# Patient Record
Sex: FEMALE | Race: WHITE | NOT HISPANIC OR LATINO
[De-identification: names, ages, dates, MRNs, and addresses within clinical notes are randomized per-mention and may not be internally consistent; named-entity substitution may affect disease eponyms.]

---

## 2021-04-22 PROBLEM — Z00.00 ENCOUNTER FOR PREVENTIVE HEALTH EXAMINATION: Status: ACTIVE | Noted: 2021-04-22

## 2021-04-28 ENCOUNTER — APPOINTMENT (OUTPATIENT)
Dept: UROGYNECOLOGY | Facility: CLINIC | Age: 46
End: 2021-04-28
Payer: COMMERCIAL

## 2021-04-28 ENCOUNTER — OUTPATIENT (OUTPATIENT)
Dept: OUTPATIENT SERVICES | Facility: HOSPITAL | Age: 46
LOS: 1 days | Discharge: HOME | End: 2021-04-28

## 2021-04-28 ENCOUNTER — APPOINTMENT (OUTPATIENT)
Dept: UROGYNECOLOGY | Facility: CLINIC | Age: 46
End: 2021-04-28

## 2021-04-28 VITALS
WEIGHT: 152 LBS | HEIGHT: 61 IN | BODY MASS INDEX: 28.7 KG/M2 | DIASTOLIC BLOOD PRESSURE: 78 MMHG | SYSTOLIC BLOOD PRESSURE: 128 MMHG

## 2021-04-28 DIAGNOSIS — N30.10 INTERSTITIAL CYSTITIS (CHRONIC) WITHOUT HEMATURIA: ICD-10-CM

## 2021-04-28 DIAGNOSIS — Z80.7 FAMILY HISTORY OF OTHER MALIGNANT NEOPLASMS OF LYMPHOID, HEMATOPOIETIC AND RELATED TISSUES: ICD-10-CM

## 2021-04-28 DIAGNOSIS — M79.10 MYALGIA, UNSPECIFIED SITE: ICD-10-CM

## 2021-04-28 PROCEDURE — 51701 INSERT BLADDER CATHETER: CPT

## 2021-04-28 PROCEDURE — 99205 OFFICE O/P NEW HI 60 MIN: CPT | Mod: 25

## 2021-04-29 LAB
APPEARANCE: CLEAR
BILIRUBIN URINE: NEGATIVE
BLOOD URINE: NEGATIVE
COLOR: NORMAL
GLUCOSE QUALITATIVE U: NEGATIVE
KETONES URINE: NEGATIVE
LEUKOCYTE ESTERASE URINE: NEGATIVE
NITRITE URINE: NEGATIVE
PH URINE: 7.5
PROTEIN URINE: NORMAL
SPECIFIC GRAVITY URINE: 1.01
UROBILINOGEN URINE: NORMAL

## 2021-04-29 NOTE — PHYSICAL EXAM
[Chaperone Present] : A chaperone was present in the examining room during all aspects of the physical examination [FreeTextEntry1] : Void: 50 cc\par PVR: 10 cc\par Urethra was prepped in sterile fashion and then a sterile catheter was used by me to drain the bladder.\par  \par Well healed incision: Pfannenstiel\par normal perineal sensation\par normal perineal reflexes\par negative cough stress test\par positive atrophy\par positive vestibular tenderness\par no prolapse\par positive urethral hypermobility\par bilateral levator ani spasm present with tenderness\par negative urethral tenderness\par mild bladder tenderness\par 1/5 Kegel\par

## 2021-04-29 NOTE — HISTORY OF PRESENT ILLNESS
[FreeTextEntry1] : \par Pt with pelvic floor dysfunction here for urogynecologic evaluation. She describes: \par \par Chief PFD: flares\par \par Diagnsed 19 years ago based on symptoms and a cysto in the office showing inflammation\par elmiron for 3 years, years ago\par bladder installations\par diagnosed with interstitial cystitis 19 years ago, received rx with elmiron x 2-3 years and bladder instillations which helped with the pain. She reports the pain subsided and has not taken medication since then. Reports "flares" of pain for the past 5 months associated with voids q2h and pain relieved with voiding. Last cystoscopy- years ago.\par gabapentin and flexeril-did not do well with\par Has done diet changes and taken irritants out of her diet years ago\par \par Pelvic organ prolapse: s/p c/s x3, no bulge, denies splinting \par Stress urinary incontinence: denies\par Overactive bladder syndrome: denies incontinence episodes  \par Voiding dysfunction: denies incomplete bladder emptying, no hesitancy \par Lower urinary tract/vaginal symptoms:  Currently denies hematuria, dysuria. No UTIs in the past year, denies hematuria, dysuria, bladder pain \par Fecal incontinence: denies\par Defecatory dysfunction: sausage\par Sexual dysfunction: reports pain with penetration, did not improve with pelvic PT. \par Pelvic pain: denies\par Vaginal dryness: denies  \par \par Her pelvic floor symptoms are significantly bothersome and negatively impacting her quality of life. \par \par

## 2021-04-29 NOTE — COUNSELING
[FreeTextEntry1] : \par We will notify you of the urine results if they are abnormal\par \par Please start taking the elavil (nerve pain medication) to help with the pain and pressure. It can make you sleepy\par \par Please call my office if you have any issues with the cost or side effects of the medication.\par \par Schedule 6 week med check with my PAJennifer (IC)

## 2021-04-29 NOTE — DISCUSSION/SUMMARY
[FreeTextEntry1] : \par Interstitial cystitis-\par We reviewed the management options for interstitial cystitis/painful bladder syndrome, including dietary modification, over the counter medications for symptom relief, other medications including Elmiron, amitriptyline, Cymbalta, neurontin and hydroxyzine. We discussed that Elmiron is the only FDA approved medication for this indication. We also discussed that PT, acupuncture, and bladder installations can be helpful. Diagnostic(and possible therapeutic) options include cysto with hydrodistention.\par \par The patient voiced understanding and agrees with medical management. The risks and benefits of elavil was reviewed.\par \par Myalgia-\par Discussed the pathophysiology of the above condition. Reviewed management options including medications (oral or vaginal suppository), injections, pelvic floor physical therapy, or referral for possible pudendal nerve blocks. The patient agrees to observation since she is not sexually active now (recently ).\par

## 2021-04-30 LAB — BACTERIA UR CULT: NORMAL

## 2021-06-15 ENCOUNTER — RX RENEWAL (OUTPATIENT)
Age: 46
End: 2021-06-15

## 2021-06-17 ENCOUNTER — APPOINTMENT (OUTPATIENT)
Dept: UROGYNECOLOGY | Facility: CLINIC | Age: 46
End: 2021-06-17
Payer: COMMERCIAL

## 2021-06-17 VITALS
HEIGHT: 61 IN | SYSTOLIC BLOOD PRESSURE: 135 MMHG | BODY MASS INDEX: 28.51 KG/M2 | WEIGHT: 151 LBS | DIASTOLIC BLOOD PRESSURE: 88 MMHG | HEART RATE: 80 BPM

## 2021-06-17 PROCEDURE — 99213 OFFICE O/P EST LOW 20 MIN: CPT

## 2021-06-17 NOTE — DISCUSSION/SUMMARY
[FreeTextEntry1] : Interstitial Cystitis\par Patient happy with Elavil 25mg QHS\par Refills provided. 30 days supply with 5 refills.\par Precautions reviewed.\par Will return in 6 months for follow up or earlier if she has any issues.\par

## 2021-06-17 NOTE — COUNSELING
[FreeTextEntry1] : If you feel like you have an infection it is important for you to call our office and we will arrange testing of your urine.\par \par Please continue taking Amitriptyline 25mg for pain. Refills sent to your pharmacy.\par \par Please call my office if you have any issues with the cost or side effects of the medication. \par \par Schedule a 6 months follow up med check appointment.\par

## 2021-06-17 NOTE — HISTORY OF PRESENT ILLNESS
[FreeTextEntry1] : Patient is here for 6 weeks med check for interstitial cystitis.\par Last seen on  4/28/21 as a new pt with flares of bladder pain. \par \par Pt was diagnosed with interstitial cystitis 19 years ago, received rx with Elmiron x 2-3 years and bladder instillations which helped with the pain.\par Reports "flares" of pain for the past 5 months associated with voids q2h and pain relieved with voiding. Last cystoscopy- years ago.\par gabapentin and flexeril: no benefit\par \par s/p C/S x3\par \par Elavil 25mg QHS\par \par Today, patient states she is  very happy with Elavil 25mg QHS  and is noticing significant improvement. Denies side effects. Patient does not feel she has an infection. SHe has not had any pain, her urination is normal and states that this has been life changing for her. \par \par Patient would like to continue Elavil 25mg QHS.\par

## 2021-12-17 ENCOUNTER — APPOINTMENT (OUTPATIENT)
Dept: OBGYN | Facility: CLINIC | Age: 46
End: 2021-12-17
Payer: COMMERCIAL

## 2021-12-17 VITALS
WEIGHT: 169.38 LBS | HEIGHT: 61 IN | BODY MASS INDEX: 31.98 KG/M2 | SYSTOLIC BLOOD PRESSURE: 130 MMHG | DIASTOLIC BLOOD PRESSURE: 84 MMHG

## 2021-12-17 VITALS
WEIGHT: 178 LBS | HEIGHT: 61 IN | BODY MASS INDEX: 33.61 KG/M2 | DIASTOLIC BLOOD PRESSURE: 72 MMHG | SYSTOLIC BLOOD PRESSURE: 102 MMHG

## 2021-12-17 DIAGNOSIS — Z87.59 PERSONAL HISTORY OF OTHER COMPLICATIONS OF PREGNANCY, CHILDBIRTH AND THE PUERPERIUM: ICD-10-CM

## 2021-12-17 LAB — TSH SERPL-ACNC: 1.76 UIU/ML

## 2021-12-17 PROCEDURE — 99386 PREV VISIT NEW AGE 40-64: CPT

## 2021-12-17 PROCEDURE — 99396 PREV VISIT EST AGE 40-64: CPT

## 2021-12-17 NOTE — HISTORY OF PRESENT ILLNESS
[No] : Patient does not have concerns regarding sex [FreeTextEntry1] : 47 yo P3, lmp 8 years ago, here for routine gyn visit. Pt followed by urogyn for interstitial cystitis. Pt denies vaginal bleeding. Pt otherwise without complaints

## 2021-12-17 NOTE — PLAN
[FreeTextEntry1] : Routine gyn\par fsh/estradiol/tsh\par pap/hpv\par mammo ref\par primary care with pmd\par pt tp rv in 1 year or prn

## 2021-12-17 NOTE — PHYSICAL EXAM
[Chaperone Present] : A chaperone was present in the examining room during all aspects of the physical examination [Appropriately responsive] : appropriately responsive [Soft] : soft [Non-tender] : non-tender [Non-distended] : non-distended [Oriented x3] : oriented x3 [Examination Of The Breasts] : a normal appearance [No Masses] : no breast masses were palpable [Labia Majora] : normal [Labia Minora] : normal [Normal] : normal [Uterine Adnexae] : normal

## 2021-12-18 LAB
ESTRADIOL SERPL-MCNC: <5 PG/ML
FSH SERPL-MCNC: 47.6 IU/L

## 2021-12-22 ENCOUNTER — OUTPATIENT (OUTPATIENT)
Dept: OUTPATIENT SERVICES | Facility: HOSPITAL | Age: 46
LOS: 1 days | Discharge: HOME | End: 2021-12-22
Payer: COMMERCIAL

## 2021-12-22 ENCOUNTER — RESULT REVIEW (OUTPATIENT)
Age: 46
End: 2021-12-22

## 2021-12-22 DIAGNOSIS — Z12.31 ENCOUNTER FOR SCREENING MAMMOGRAM FOR MALIGNANT NEOPLASM OF BREAST: ICD-10-CM

## 2021-12-22 LAB — HPV HIGH+LOW RISK DNA PNL CVX: NOT DETECTED

## 2021-12-22 PROCEDURE — 77063 BREAST TOMOSYNTHESIS BI: CPT | Mod: 26

## 2021-12-22 PROCEDURE — 77067 SCR MAMMO BI INCL CAD: CPT | Mod: 26

## 2021-12-23 LAB — CYTOLOGY CVX/VAG DOC THIN PREP: NORMAL

## 2022-03-04 ENCOUNTER — TRANSCRIPTION ENCOUNTER (OUTPATIENT)
Age: 47
End: 2022-03-04

## 2022-06-27 ENCOUNTER — APPOINTMENT (OUTPATIENT)
Dept: UROGYNECOLOGY | Facility: CLINIC | Age: 47
End: 2022-06-27
Payer: COMMERCIAL

## 2022-06-27 VITALS
HEIGHT: 61 IN | WEIGHT: 165 LBS | HEART RATE: 82 BPM | DIASTOLIC BLOOD PRESSURE: 82 MMHG | SYSTOLIC BLOOD PRESSURE: 121 MMHG | BODY MASS INDEX: 31.15 KG/M2

## 2022-06-27 PROCEDURE — 99213 OFFICE O/P EST LOW 20 MIN: CPT

## 2022-06-27 NOTE — HISTORY OF PRESENT ILLNESS
[FreeTextEntry1] : Patient is here for 12 month med check for interstitial cystitis.\par Last seen on 6/17/2021 for med check.\par \par Pt was diagnosed with interstitial cystitis 19 years ago, received rx with Elmiron x 2-3 years and bladder instillations which helped with the pain.\par Reports "flares" of pain for the past 5 months associated with voids q2h and pain relieved with voiding. Last cystoscopy- years ago.\par gabapentin and flexeril: no benefit\par \par s/p C/S x3\par \par Elavil 25mg QHS\par \par Today, patient states she is happy with Elavil 25 mg QHS and is noticing improvement. Denies side effects. Patient does not feel she has an infection.\par \par Patient would like to continue Elavil 25 mg QHS. She occasionally skips a day or two and does not experience pain and is very happy with the medication. \par

## 2022-06-27 NOTE — COUNSELING
[FreeTextEntry1] : \par If you feel like you have an infection it is important for you to call our office and we will arrange testing of your urine.\par \par Please continue taking Amitriptyline 25 mg for frequency. Refills sent to your pharmacy\par \par Please call my office if you have any issues with the cost or side effects of the medication. \par \par Schedule a 12 months follow up med check appointment with RACHEL Sen.\par

## 2022-06-27 NOTE — DISCUSSION/SUMMARY
[FreeTextEntry1] : \par Interstitial Cystitis\par Patient happy with Elavil 25 mg QHS\par Refills provided. 90 days supply with 3 refills.\par Precautions reviewed.\par Will return in 12 months for follow up or earlier if she has any issues.\par \par

## 2023-02-08 ENCOUNTER — RESULT REVIEW (OUTPATIENT)
Age: 48
End: 2023-02-08

## 2023-02-08 ENCOUNTER — OUTPATIENT (OUTPATIENT)
Dept: OUTPATIENT SERVICES | Facility: HOSPITAL | Age: 48
LOS: 1 days | End: 2023-02-08
Payer: COMMERCIAL

## 2023-02-08 DIAGNOSIS — Z12.31 ENCOUNTER FOR SCREENING MAMMOGRAM FOR MALIGNANT NEOPLASM OF BREAST: ICD-10-CM

## 2023-02-08 DIAGNOSIS — Z00.8 ENCOUNTER FOR OTHER GENERAL EXAMINATION: ICD-10-CM

## 2023-02-08 PROCEDURE — 77063 BREAST TOMOSYNTHESIS BI: CPT

## 2023-02-08 PROCEDURE — 77067 SCR MAMMO BI INCL CAD: CPT

## 2023-02-08 PROCEDURE — 77063 BREAST TOMOSYNTHESIS BI: CPT | Mod: 26

## 2023-02-08 PROCEDURE — 77067 SCR MAMMO BI INCL CAD: CPT | Mod: 26

## 2023-02-10 ENCOUNTER — APPOINTMENT (OUTPATIENT)
Dept: OBGYN | Facility: CLINIC | Age: 48
End: 2023-02-10
Payer: COMMERCIAL

## 2023-02-10 VITALS — BODY MASS INDEX: 31.55 KG/M2 | DIASTOLIC BLOOD PRESSURE: 82 MMHG | SYSTOLIC BLOOD PRESSURE: 112 MMHG | WEIGHT: 167 LBS

## 2023-02-10 PROCEDURE — 99396 PREV VISIT EST AGE 40-64: CPT

## 2023-02-10 NOTE — PLAN
[FreeTextEntry1] : Routine gyn\par no pap\par mammo up to date\par primary care with pmd\par pt to rv in 1 year or prn

## 2023-06-27 ENCOUNTER — APPOINTMENT (OUTPATIENT)
Dept: UROGYNECOLOGY | Facility: CLINIC | Age: 48
End: 2023-06-27
Payer: COMMERCIAL

## 2023-06-27 VITALS
SYSTOLIC BLOOD PRESSURE: 114 MMHG | HEIGHT: 61 IN | DIASTOLIC BLOOD PRESSURE: 76 MMHG | HEART RATE: 73 BPM | BODY MASS INDEX: 32.1 KG/M2 | WEIGHT: 170 LBS

## 2023-06-27 PROCEDURE — 99214 OFFICE O/P EST MOD 30 MIN: CPT

## 2023-06-28 NOTE — HISTORY OF PRESENT ILLNESS
[FreeTextEntry1] : Patient is here for 12 month med check for interstitial cystitis.\par Last seen on 6/27/2022 for med check.\par \par Pt was diagnosed with interstitial cystitis 19 years ago, received rx with Elmiron x 2-3 years and bladder instillations which helped with the pain.\par Reports "flares" of pain for the past 5 months associated with voids q2h and pain relieved with voiding. Last cystoscopy- years ago.\par gabapentin and flexeril: no benefit\par \par s/p C/S x3\par \par Elavil 25mg QHS\par \par Today, patient states she is happy with Elavil 25 mg QHS and is noticing improvement. Denies side effects. Patient does not feel she has an infection. Feels some pain or discomfort if she skips a day or misses a pill. \par \par \par

## 2023-06-28 NOTE — DISCUSSION/SUMMARY
[FreeTextEntry1] : Interstitial Cystitis\par Patient happy with Elavil 25 mg QHS\par Refills provided. 90 days supply with 3 refills.\par Precautions reviewed.\par Will return in 12 months for follow up or earlier if she has any issues.\par \par

## 2023-06-28 NOTE — COUNSELING
[FreeTextEntry1] : If you feel like you have an infection it is important for you to call our office and we will arrange testing of your urine.\par \par Please continue taking Amitriptyline 25 mg for frequency. Refills sent to your pharmacy\par \par Please call my office if you have any issues with the cost or side effects of the medication. \par \par Schedule a 12 months follow up med check appointment with RACHEL Sen.\par

## 2024-02-28 ENCOUNTER — RESULT REVIEW (OUTPATIENT)
Age: 49
End: 2024-02-28

## 2024-02-28 ENCOUNTER — OUTPATIENT (OUTPATIENT)
Dept: OUTPATIENT SERVICES | Facility: HOSPITAL | Age: 49
LOS: 1 days | End: 2024-02-28
Payer: COMMERCIAL

## 2024-02-28 DIAGNOSIS — Z12.31 ENCOUNTER FOR SCREENING MAMMOGRAM FOR MALIGNANT NEOPLASM OF BREAST: ICD-10-CM

## 2024-02-28 PROCEDURE — 77067 SCR MAMMO BI INCL CAD: CPT | Mod: 26

## 2024-02-28 PROCEDURE — 77067 SCR MAMMO BI INCL CAD: CPT

## 2024-02-28 PROCEDURE — 77063 BREAST TOMOSYNTHESIS BI: CPT | Mod: 26

## 2024-02-28 PROCEDURE — 77063 BREAST TOMOSYNTHESIS BI: CPT

## 2024-02-29 DIAGNOSIS — Z12.31 ENCOUNTER FOR SCREENING MAMMOGRAM FOR MALIGNANT NEOPLASM OF BREAST: ICD-10-CM

## 2024-03-01 ENCOUNTER — APPOINTMENT (OUTPATIENT)
Dept: OBGYN | Facility: CLINIC | Age: 49
End: 2024-03-01
Payer: COMMERCIAL

## 2024-03-01 VITALS — SYSTOLIC BLOOD PRESSURE: 116 MMHG | BODY MASS INDEX: 31.18 KG/M2 | WEIGHT: 165 LBS | DIASTOLIC BLOOD PRESSURE: 82 MMHG

## 2024-03-01 DIAGNOSIS — Z01.419 ENCOUNTER FOR GYNECOLOGICAL EXAMINATION (GENERAL) (ROUTINE) W/OUT ABNORMAL FINDINGS: ICD-10-CM

## 2024-03-01 PROCEDURE — 99396 PREV VISIT EST AGE 40-64: CPT

## 2024-03-01 RX ORDER — HYDROCHLOROTHIAZIDE 100 MG
TABLET ORAL
Refills: 0 | Status: ACTIVE | COMMUNITY

## 2024-03-01 NOTE — PHYSICAL EXAM
[Chaperone Present] : A chaperone was present in the examining room during all aspects of the physical examination [Alert] : alert [Appropriately responsive] : appropriately responsive [No Acute Distress] : no acute distress [Non-tender] : non-tender [Soft] : soft [Non-distended] : non-distended [Oriented x3] : oriented x3 [Examination Of The Breasts] : a normal appearance [No Masses] : no breast masses were palpable [Labia Majora] : normal [Labia Minora] : normal [Normal] : normal [Uterine Adnexae] : normal

## 2024-03-01 NOTE — HISTORY OF PRESENT ILLNESS
[FreeTextEntry1] : 47 y/o P3, LMP 10 years ago, here today for annual exam. Pt under urogyn care for interstitial cystitis. Pt happy with treatment    Denies abnormal vaginal discharge, pelvic pain, urinary symptoms, abnormal bleeding.   Last Annual: 2/2023 Last PAP: 12/2021 Last Mammo: 2/2024 Last Colonoscopy: Sched. in June 2024   Meds: HCTZ  [Post-Menopause, No Sxs] : post-menopausal, currently without symptoms [No] : Patient does not have concerns regarding sex

## 2024-03-04 LAB — HPV HIGH+LOW RISK DNA PNL CVX: NOT DETECTED

## 2024-03-06 LAB — CYTOLOGY CVX/VAG DOC THIN PREP: NORMAL

## 2024-06-04 ENCOUNTER — APPOINTMENT (OUTPATIENT)
Dept: UROGYNECOLOGY | Facility: CLINIC | Age: 49
End: 2024-06-04
Payer: COMMERCIAL

## 2024-06-04 VITALS
SYSTOLIC BLOOD PRESSURE: 115 MMHG | DIASTOLIC BLOOD PRESSURE: 79 MMHG | HEIGHT: 61 IN | BODY MASS INDEX: 15.11 KG/M2 | WEIGHT: 80 LBS | HEART RATE: 80 BPM

## 2024-06-04 DIAGNOSIS — N30.10 INTERSTITIAL CYSTITIS (CHRONIC) W/OUT HEMATURIA: ICD-10-CM

## 2024-06-04 PROCEDURE — 99214 OFFICE O/P EST MOD 30 MIN: CPT

## 2024-06-04 RX ORDER — MULTIVITAMIN
TABLET ORAL
Refills: 0 | Status: COMPLETED | COMMUNITY
End: 2024-06-04

## 2024-06-04 RX ORDER — AMITRIPTYLINE HYDROCHLORIDE 25 MG/1
25 TABLET, FILM COATED ORAL
Qty: 90 | Refills: 3 | Status: ACTIVE | COMMUNITY
Start: 2021-04-28 | End: 1900-01-01

## 2024-06-04 NOTE — HISTORY OF PRESENT ILLNESS
[FreeTextEntry1] : Patient is here for 12 month med check for interstitial cystitis. Last seen on 6/27/2023 for med check.  Pt was diagnosed with interstitial cystitis 19 years ago, received rx with Elmiron x 2-3 years and bladder instillations which helped with the pain. Reports "flares" of pain for the past 5 months associated with voids q2h and pain relieved with voiding. Last cystoscopy- years ago. gabapentin and flexeril: no benefit  s/p C/S x3  Elavil 25mg QHS  Today, patient states she is happy with Elavil 25 mg QHS and is noticing 95% improvement. Denies side effects. Patient does not feel she has an infection.   Notes that for the past 1-2 months, she has been experiencing frequency in the morning. She wakes up at 4am and urinates a few times before 8am. She notes that she has increased the amounts of fluids that she drinks, feels that she empties her bladder well. Denies any pain or discomfort.

## 2024-06-04 NOTE — COUNSELING
[FreeTextEntry1] : If you feel like you have an infection it is important for you to call our office and we will arrange testing of your urine.  Please continue taking Elavil 25 mg at bedtime. Refills sent to your pharmacy.  We will contact you if the urine results are abnormal.  Please call my office if you have any issues with the cost or side effects of the medication.   Schedule a 12 months follow up med check appointment.

## 2024-06-04 NOTE — DISCUSSION/SUMMARY
[FreeTextEntry1] : Interstitial Cystitis Patient happy with Elavil 25 mg QHS Refills provided. 90 days supply with 3 refills. Precautions reviewed. Will return in 12 months for follow up or earlier if she has any issues.  Discussed increasing dosage or trying OAB medications for frequency, patient would like to continue current dosage and will call in a few weeks if she wants to make any changes.   Urine culture obtained. (clean catch)  Will follow up. Will treat accordingly if necessary

## 2024-06-04 NOTE — REASON FOR VISIT
[TextEntry] :  Reason for visit: Follow up-medication check   Voids per day: 1-7  Voids per night: 1  Urge incontinence: No  Stress incontinence: No  Constipation: No  Fecal incontinence: No  Vaginal bulge: No

## 2024-06-07 LAB — BACTERIA UR CULT: NORMAL

## 2024-12-25 PROBLEM — F10.90 ALCOHOL USE: Status: INACTIVE | Noted: 2021-04-28

## 2025-03-28 ENCOUNTER — RESULT REVIEW (OUTPATIENT)
Age: 50
End: 2025-03-28

## 2025-03-28 ENCOUNTER — OUTPATIENT (OUTPATIENT)
Dept: OUTPATIENT SERVICES | Facility: HOSPITAL | Age: 50
LOS: 1 days | End: 2025-03-28
Payer: COMMERCIAL

## 2025-03-28 DIAGNOSIS — Z12.31 ENCOUNTER FOR SCREENING MAMMOGRAM FOR MALIGNANT NEOPLASM OF BREAST: ICD-10-CM

## 2025-03-28 PROCEDURE — 77067 SCR MAMMO BI INCL CAD: CPT

## 2025-03-28 PROCEDURE — 77063 BREAST TOMOSYNTHESIS BI: CPT

## 2025-03-28 PROCEDURE — 77067 SCR MAMMO BI INCL CAD: CPT | Mod: 26

## 2025-03-28 PROCEDURE — 77063 BREAST TOMOSYNTHESIS BI: CPT | Mod: 26

## 2025-03-29 DIAGNOSIS — Z12.31 ENCOUNTER FOR SCREENING MAMMOGRAM FOR MALIGNANT NEOPLASM OF BREAST: ICD-10-CM

## 2025-06-06 ENCOUNTER — APPOINTMENT (OUTPATIENT)
Dept: UROGYNECOLOGY | Facility: CLINIC | Age: 50
End: 2025-06-06
Payer: COMMERCIAL

## 2025-06-06 ENCOUNTER — APPOINTMENT (OUTPATIENT)
Dept: OBGYN | Facility: CLINIC | Age: 50
End: 2025-06-06
Payer: COMMERCIAL

## 2025-06-06 ENCOUNTER — NON-APPOINTMENT (OUTPATIENT)
Age: 50
End: 2025-06-06

## 2025-06-06 VITALS
DIASTOLIC BLOOD PRESSURE: 72 MMHG | HEIGHT: 61 IN | WEIGHT: 170 LBS | HEART RATE: 91 BPM | SYSTOLIC BLOOD PRESSURE: 104 MMHG | BODY MASS INDEX: 32.1 KG/M2

## 2025-06-06 VITALS — SYSTOLIC BLOOD PRESSURE: 130 MMHG | WEIGHT: 170 LBS | BODY MASS INDEX: 32.12 KG/M2 | DIASTOLIC BLOOD PRESSURE: 86 MMHG

## 2025-06-06 PROCEDURE — 99459 PELVIC EXAMINATION: CPT

## 2025-06-06 PROCEDURE — 99396 PREV VISIT EST AGE 40-64: CPT

## 2025-06-06 PROCEDURE — G2211 COMPLEX E/M VISIT ADD ON: CPT | Mod: NC

## 2025-06-06 PROCEDURE — 99213 OFFICE O/P EST LOW 20 MIN: CPT

## 2025-08-11 ENCOUNTER — APPOINTMENT (OUTPATIENT)
Dept: VASCULAR SURGERY | Facility: CLINIC | Age: 50
End: 2025-08-11
Payer: COMMERCIAL

## 2025-08-11 VITALS
DIASTOLIC BLOOD PRESSURE: 98 MMHG | SYSTOLIC BLOOD PRESSURE: 143 MMHG | HEART RATE: 101 BPM | WEIGHT: 165 LBS | BODY MASS INDEX: 31.15 KG/M2 | HEIGHT: 61 IN

## 2025-08-11 DIAGNOSIS — M79.89 OTHER SPECIFIED SOFT TISSUE DISORDERS: ICD-10-CM

## 2025-08-11 PROCEDURE — 99204 OFFICE O/P NEW MOD 45 MIN: CPT

## 2025-08-11 PROCEDURE — 93970 EXTREMITY STUDY: CPT
